# Patient Record
Sex: MALE | Race: ASIAN | NOT HISPANIC OR LATINO | URBAN - METROPOLITAN AREA
[De-identification: names, ages, dates, MRNs, and addresses within clinical notes are randomized per-mention and may not be internally consistent; named-entity substitution may affect disease eponyms.]

---

## 2024-08-16 ENCOUNTER — EMERGENCY (EMERGENCY)
Facility: HOSPITAL | Age: 44
LOS: 1 days | Discharge: LEFT BEFORE DISCHARGE | End: 2024-08-16
Attending: STUDENT IN AN ORGANIZED HEALTH CARE EDUCATION/TRAINING PROGRAM
Payer: SELF-PAY

## 2024-08-16 VITALS
HEART RATE: 81 BPM | DIASTOLIC BLOOD PRESSURE: 87 MMHG | SYSTOLIC BLOOD PRESSURE: 138 MMHG | OXYGEN SATURATION: 96 % | RESPIRATION RATE: 18 BRPM | TEMPERATURE: 98 F

## 2024-08-16 PROCEDURE — 99285 EMERGENCY DEPT VISIT HI MDM: CPT

## 2024-08-16 PROCEDURE — 82962 GLUCOSE BLOOD TEST: CPT

## 2024-08-16 PROCEDURE — 99053 MED SERV 10PM-8AM 24 HR FAC: CPT

## 2024-08-16 PROCEDURE — 99283 EMERGENCY DEPT VISIT LOW MDM: CPT

## 2024-08-16 NOTE — ED PROVIDER NOTE - PROGRESS NOTE DETAILS
Lucks-DO: pt ambulatory with steady gait, states he will call friend for pickup. Per RN, pt eloped prior to DC.

## 2024-08-16 NOTE — ED PROVIDER NOTE - CLINICAL SUMMARY MEDICAL DECISION MAKING FREE TEXT BOX
Gabby: 44-year-old male with no pertinent past medical history presents alcohol intoxication.  Patient reports drinking multiple mixed drinks tonight, states he was brought by EMS for alcohol intoxication.  Denies any recent injury or trauma.  Patient states he has no prior past medical history.  Denies daily alcohol use.  Patient states he has no complaints at this time and feels well. Physical exam per above. No signs of trauma. FS glucose without evidence of hypoglycemia. Will observe for sobriety and reassess with dispo pending workup.

## 2024-08-16 NOTE — ED PROVIDER NOTE - OBJECTIVE STATEMENT
44-year-old male with no pertinent past medical history presents alcohol intoxication.  Patient reports drinking multiple mixed drinks tonight, states he was brought by EMS for alcohol intoxication.  Denies any recent injury or trauma.  Patient states he has no prior past medical history.  Denies daily alcohol use.  Patient states he has no complaints at this time and feels well.

## 2024-08-16 NOTE — ED ADULT NURSE NOTE - NSFALLUNIVINTERV_ED_ALL_ED
Bed/Stretcher in lowest position, wheels locked, appropriate side rails in place/Call bell, personal items and telephone in reach/Instruct patient to call for assistance before getting out of bed/chair/stretcher/Non-slip footwear applied when patient is off stretcher/[place] to call system/Physically safe environment - no spills, clutter or unnecessary equipment/Purposeful proactive rounding/Room/bathroom lighting operational, light cord in reach

## 2024-08-16 NOTE — ED PROVIDER NOTE - PHYSICAL EXAMINATION
CONSTITUTIONAL: non-toxic, inebriated appearing  SKIN: no rash, no petechiae.  EYES: PERRL, EOMI, pink conjunctiva, anicteric  HEENT: NC/AT  NECK: Supple; no meningismus, no JVD  CARD: RRR, no murmurs, equal radial pulses bilaterally 2+  RESP: CTAB, no respiratory distress  ABD: Soft, non-tender, non-distended, no peritoneal signs  EXT: Normal ROM x4. No edema.   NEURO: Alert, oriented. Neuro exam nonfocal  PSYCH: Cooperative, appropriate.